# Patient Record
Sex: FEMALE | Race: WHITE | NOT HISPANIC OR LATINO | Employment: FULL TIME | URBAN - METROPOLITAN AREA
[De-identification: names, ages, dates, MRNs, and addresses within clinical notes are randomized per-mention and may not be internally consistent; named-entity substitution may affect disease eponyms.]

---

## 2021-05-30 ENCOUNTER — RECORDS - HEALTHEAST (OUTPATIENT)
Dept: ADMINISTRATIVE | Facility: CLINIC | Age: 56
End: 2021-05-30

## 2021-08-30 ENCOUNTER — OFFICE VISIT (OUTPATIENT)
Dept: ORTHOPEDICS | Facility: CLINIC | Age: 56
End: 2021-08-30
Payer: COMMERCIAL

## 2021-08-30 VITALS
BODY MASS INDEX: 39.27 KG/M2 | WEIGHT: 200 LBS | HEIGHT: 60 IN | DIASTOLIC BLOOD PRESSURE: 84 MMHG | SYSTOLIC BLOOD PRESSURE: 122 MMHG

## 2021-08-30 DIAGNOSIS — S89.91XA INJURY OF RIGHT KNEE, INITIAL ENCOUNTER: Primary | ICD-10-CM

## 2021-08-30 PROCEDURE — 99204 OFFICE O/P NEW MOD 45 MIN: CPT | Performed by: PEDIATRICS

## 2021-08-30 RX ORDER — LEVOTHYROXINE SODIUM 100 UG/1
100 TABLET ORAL
COMMUNITY
Start: 2020-12-14

## 2021-08-30 ASSESSMENT — MIFFLIN-ST. JEOR: SCORE: 1433.69

## 2021-08-30 NOTE — LETTER
8/30/2021         RE: Aurelia Cerna  [address] [facility] [address]        Dear Colleague,    Thank you for referring your patient, Aurelia Cerna, to the [facility]. Please see a copy of my visit note below.    ASSESSMENT & PLAN    Aurelia was seen today for pain.    Diagnoses and all orders for this visit:    Injury of right knee, initial encounter  -     MR Knee Right w/o Contrast; Future      This issue is acute and Unchanged.    We discussed these other possible diagnosis: meniscal injury, patellar subluxation  Given injury, concern for meniscal tear, will obtain MRI. Discussed supportive care in the interim.    Plan:  - Today's Plan of Care:  MRI of the Right Knee - Call [phone] to schedule MRI  Continue with relative rest and activity modification, Ice, Compression, and Elevation.  Can apply ice 10-15 minutes 3-4 times per day as needed. OTC medications as needed.  Crutches as needed  Home Exercise Program - range of motion, quad sets, straight leg raises    -We also discussed other future treatment options:  Referral to Physical Therapy    Follow Up: In clinic with Dr. Soolrio after MRI (wait at least 1-2 days)    Concerning signs and symptoms were reviewed.  The patient expressed understanding of this management plan and all questions were answered at this time.    Anna Solorio MD [facility]  Sports Medicine Physician  [facility]      -----  Chief Complaint   Patient presents with     Right Knee - Pain       SUBJECTIVE  Aurelia Cerna is a/an 55 year old female who is seen as a self referral for evaluation of right knee.  Pain began after walking to her car and had a loud pop in her knee on 8/21/21.  She was seen in the ER and was placed in a knee immobilizer, was using crutches as well, but feels she can walk with immobilizer now.    The patient is seen by themselves.    Onset: 9 day(s) ago. Reports insidious onset without acute  precipitating event.  Location of Pain: right knee  Worsened by: laying on her side, walking   Better with: rest   Treatments tried: ice and ibuprofen, immobilizer   Associated symptoms: feeling of instability    Orthopedic/Surgical history: NO  Social History/Occupation:      No family history pertinent to patient's problem today.    REVIEW OF SYSTEMS:  Review of Systems  Skin: no bruising, yes swelling  Musculoskeletal: as above  Neurologic: no numbness, paresthesias  Remainder of review of systems is negative including constitutional, CV, pulmonary, GI, except as noted in HPI or medical history.    OBJECTIVE:  /84   Ht 1.524 m (5')   Wt 90.7 kg (200 lb)   BMI 39.06 kg/m     General: healthy, alert and in no distress  HEENT: no scleral icterus or conjunctival erythema  Skin: no suspicious lesions or rash. No jaundice.  CV: distal perfusion intact  Resp: normal respiratory effort without conversational dyspnea   Psych: normal mood and affect  Gait: normal steady gait with appropriate coordination and balance  Neuro: Normal light sensory exam of lower extremity    Bilateral Knee exam  Inspection:      moderate effusion right    Patella:      Crepitus noted in the patellofemoral joint bilateral    Tender:      medial patellar border right       medial joint line right    Non Tender:      remainder of knee area bilateral    Knee ROM:      Full active and passive ROM with flexion and extension bilateral    Strength:      5-/5 with knee extension right    Special Tests:     positive (+) Johanna right       neg (-) Lachmans right       neg (-) anterior drawer right       neg (-) posterior drawer right       neg (-) varus at 0 deg and 30 deg right       neg (-) valgus at 0 deg and 30 deg right    Gait:      normal    Neurovascular:      2+ peripheral pulses bilaterally and brisk capillary refill       sensation grossly intact    RADIOLOGY:  I independently ordered, visualized and reviewed these images  with the patient  3 XR views of right knee reviewed 8/21/2021: no acute bony abnormality, no significant degenerative change  - will follow official read    Review of the result(s) of each unique test - XR           Again, thank you for allowing me to participate in the care of your patient.        Sincerely,        Anna Solorio MD

## 2021-08-30 NOTE — PATIENT INSTRUCTIONS
We discussed these other possible diagnosis: meniscal injury, patellar subluxation    Plan:  - Today's Plan of Care:  MRI of the Right Knee - Call [phone] to schedule MRI  Continue with relative rest and activity modification, Ice, Compression, and Elevation.  Can apply ice 10-15 minutes 3-4 times per day as needed. OTC medications as needed.  Crutches as needed  Home Exercise Program - range of motion, quad sets, straight leg raises    -We also discussed other future treatment options:  Referral to Physical Therapy    Follow Up: In clinic with Dr. Solorio after MRI (wait at least 1-2 days)    If you have any further questions for your physician or physician s care team you can call [phone] and use option 3 to leave a voice message. Calls received during business hours will be returned same day.

## 2021-08-30 NOTE — PROGRESS NOTES
ASSESSMENT & PLAN    Aurelia was seen today for pain.    Diagnoses and all orders for this visit:    Injury of right knee, initial encounter  -     MR Knee Right w/o Contrast; Future      This issue is acute and Unchanged.    We discussed these other possible diagnosis: meniscal injury, patellar subluxation  Given injury, concern for meniscal tear, will obtain MRI. Discussed supportive care in the interim.    Plan:  - Today's Plan of Care:  MRI of the Right Knee - Call [phone] to schedule MRI  Continue with relative rest and activity modification, Ice, Compression, and Elevation.  Can apply ice 10-15 minutes 3-4 times per day as needed. OTC medications as needed.  Crutches as needed  Home Exercise Program - range of motion, quad sets, straight leg raises    -We also discussed other future treatment options:  Referral to Physical Therapy    Follow Up: In clinic with Dr. Solorio after MRI (wait at least 1-2 days)    Concerning signs and symptoms were reviewed.  The patient expressed understanding of this management plan and all questions were answered at this time.    Anna Solorio MD [facility]  Sports Medicine Physician  [facility]      -----  Chief Complaint   Patient presents with     Right Knee - Pain       SUBJECTIVE  Aurelia Cerna is a/an 55 year old female who is seen as a self referral for evaluation of right knee.  Pain began after walking to her car and had a loud pop in her knee on 8/21/21.  She was seen in the ER and was placed in a knee immobilizer, was using crutches as well, but feels she can walk with immobilizer now.    The patient is seen by themselves.    Onset: 9 day(s) ago. Reports insidious onset without acute precipitating event.  Location of Pain: right knee  Worsened by: laying on her side, walking   Better with: rest   Treatments tried: ice and ibuprofen, immobilizer   Associated symptoms: feeling of instability    Orthopedic/Surgical history: NO  Social  History/Occupation:      No family history pertinent to patient's problem today.    REVIEW OF SYSTEMS:  Review of Systems  Skin: no bruising, yes swelling  Musculoskeletal: as above  Neurologic: no numbness, paresthesias  Remainder of review of systems is negative including constitutional, CV, pulmonary, GI, except as noted in HPI or medical history.    OBJECTIVE:  /84   Ht 1.524 m (5')   Wt 90.7 kg (200 lb)   BMI 39.06 kg/m     General: healthy, alert and in no distress  HEENT: no scleral icterus or conjunctival erythema  Skin: no suspicious lesions or rash. No jaundice.  CV: distal perfusion intact  Resp: normal respiratory effort without conversational dyspnea   Psych: normal mood and affect  Gait: normal steady gait with appropriate coordination and balance  Neuro: Normal light sensory exam of lower extremity    Bilateral Knee exam  Inspection:      moderate effusion right    Patella:      Crepitus noted in the patellofemoral joint bilateral    Tender:      medial patellar border right       medial joint line right    Non Tender:      remainder of knee area bilateral    Knee ROM:      Full active and passive ROM with flexion and extension bilateral    Strength:      5-/5 with knee extension right    Special Tests:     positive (+) Johanna right       neg (-) Lachmans right       neg (-) anterior drawer right       neg (-) posterior drawer right       neg (-) varus at 0 deg and 30 deg right       neg (-) valgus at 0 deg and 30 deg right    Gait:      normal    Neurovascular:      2+ peripheral pulses bilaterally and brisk capillary refill       sensation grossly intact    RADIOLOGY:  I independently ordered, visualized and reviewed these images with the patient  3 XR views of right knee reviewed 8/21/2021: no acute bony abnormality, no significant degenerative change  - will follow official read    Review of the result(s) of each unique test - XR

## 2022-01-05 ENCOUNTER — HOSPITAL ENCOUNTER (OUTPATIENT)
Dept: MRI IMAGING | Facility: CLINIC | Age: 57
Discharge: HOME | End: 2022-01-05
Attending: PEDIATRICS | Admitting: PEDIATRICS
Payer: COMMERCIAL

## 2022-01-05 DIAGNOSIS — S89.91XA INJURY OF RIGHT KNEE, INITIAL ENCOUNTER: ICD-10-CM

## 2022-01-05 PROCEDURE — 73721 MRI JNT OF LWR EXTRE W/O DYE: CPT | Mod: RT

## 2022-01-10 ENCOUNTER — TELEPHONE (OUTPATIENT)
Dept: ORTHOPEDICS | Facility: CLINIC | Age: 57
End: 2022-01-10
Payer: COMMERCIAL

## 2022-01-10 DIAGNOSIS — S89.91XA INJURY OF RIGHT KNEE, INITIAL ENCOUNTER: Primary | ICD-10-CM

## 2022-01-10 DIAGNOSIS — S89.91XA RIGHT KNEE INJURY: ICD-10-CM

## 2022-01-10 NOTE — TELEPHONE ENCOUNTER
Please call patient with MRI results:    MR Knee Right w/o Contrast  Result Date: 1/5/2022  EXAM: MR KNEE RIGHT W/O CONTRAST LOCATION: [facility] DATE/TIME: 1/5/2022 6:27 PM INDICATION: Knee pain following injury. Evaluate meniscus. COMPARISON: 8/21/2021 radiographs. TECHNIQUE: Unenhanced. FINDINGS: MEDIAL COMPARTMENT: -Meniscus: High-grade tear of the posterior horn medial meniscus root attachment. The body of the medial meniscus is peripherally extruded. -Cartilage: Moderate to high-grade chondromalacia in the central aspect of the medial femoral condyle. Mild chondromalacia in the central aspect of the medial tibia. LATERAL COMPARTMENT: -Meniscus: Normal. -Cartilage: Normal. PATELLOFEMORAL COMPARTMENT: -Alignment: Patella midline. No subluxation or tilting. -Cartilage: Mild chondromalacia in the medial patellar facet. CRUCIATE LIGAMENTS: -ACL: Normal. -PCL: Normal. COLLATERAL LIGAMENTS: -Medial collateral ligament: Superficial and deep fibers are normal. -Lateral collateral ligament: Normal. POSTEROMEDIAL CORNER: -Distal semimembranosus tendon is normal. -Pes anserine tendons are normal. Posteromedial corner complex ligaments are intact. POSTEROLATERAL CORNER: -Popliteal tendon is intact. No tendinopathy. -Biceps femoris tendon and posterolateral corner complex ligaments are intact. EXTENSOR MECHANISM: -Quadriceps tendon: Normal. -Patellar tendon: Normal. -Patellofemoral ligaments and retinacula: Intact. JOINT: -Small knee joint effusion and popliteal cyst. No loose body or synovitis. BONES: -No fracture or concerning marrow replacing lesion. SOFT TISSUES: -Unremarkable.   IMPRESSION: 1.  High-grade tear of the posterior horn medial meniscus root attachment. The body medial meniscus is peripherally extruded. 2.  Medial compartment chondromalacia centrally. 3.  Mild chondromalacia in the medial patellar facet. 4.  Small knee joint effusion and popliteal cyst.    In Summary:  - medial  meniscal root tear  - some early arthritis  - joint effusion and popliteal cyst    I Recommend:  - Given medial meniscal root tear I would likely recommend orthopedic surgery referral, however, I also have not seen the patient in clinic in ~ 5 months  - Can place surgical referral if desired or schedule appointment with me to review images, repeat exam and discuss my recommendations    Anna Solorio MD

## 2022-01-10 NOTE — TELEPHONE ENCOUNTER
Called LVM to return phone call to discuss MRI results and next steps.    Cecelia Vanegas ATC, CSCS

## 2022-01-10 NOTE — TELEPHONE ENCOUNTER
Spoke with Aurelia. Relayed the following information:     In Summary:  - medial meniscal root tear  - some early arthritis  - joint effusion and popliteal cyst     I Recommend:  - Given medial meniscal root tear I would likely recommend orthopedic surgery referral, however, I also have not seen the patient in clinic in ~ 5 months  - Can place surgical referral if desired or schedule appointment with me to review images, repeat exam and discuss my recommendations    Patient was understanding and had no further questions. Requested orthopedic surgery referral. Referral is placed for the patient.      Cecelia Vanegas ATC, CSCS  Dr. Solorio's Extender

## 2022-01-13 NOTE — RESULT ENCOUNTER NOTE
Patient was called and results were reviewed over the phone.    Anna Solorio MD CAQ  Primary Care Sports Medicine  [facility]

## 2022-01-19 ENCOUNTER — ANCILLARY PROCEDURE (OUTPATIENT)
Dept: GENERAL RADIOLOGY | Facility: CLINIC | Age: 57
End: 2022-01-19
Attending: ORTHOPAEDIC SURGERY
Payer: COMMERCIAL

## 2022-01-19 ENCOUNTER — OFFICE VISIT (OUTPATIENT)
Dept: ORTHOPEDICS | Facility: CLINIC | Age: 57
End: 2022-01-19
Payer: COMMERCIAL

## 2022-01-19 VITALS
HEART RATE: 68 BPM | RESPIRATION RATE: 16 BRPM | DIASTOLIC BLOOD PRESSURE: 70 MMHG | WEIGHT: 190 LBS | SYSTOLIC BLOOD PRESSURE: 104 MMHG | HEIGHT: 60 IN | BODY MASS INDEX: 37.3 KG/M2

## 2022-01-19 DIAGNOSIS — M25.561 CHRONIC PAIN OF RIGHT KNEE: ICD-10-CM

## 2022-01-19 DIAGNOSIS — G89.29 CHRONIC PAIN OF RIGHT KNEE: ICD-10-CM

## 2022-01-19 DIAGNOSIS — S83.231A COMPLEX TEAR OF MEDIAL MENISCUS OF RIGHT KNEE AS CURRENT INJURY, INITIAL ENCOUNTER: ICD-10-CM

## 2022-01-19 DIAGNOSIS — M17.11 PRIMARY OSTEOARTHRITIS OF RIGHT KNEE: Primary | ICD-10-CM

## 2022-01-19 DIAGNOSIS — S89.91XA RIGHT KNEE INJURY: ICD-10-CM

## 2022-01-19 PROCEDURE — 73562 X-RAY EXAM OF KNEE 3: CPT | Mod: RT | Performed by: RADIOLOGY

## 2022-01-19 PROCEDURE — 20610 DRAIN/INJ JOINT/BURSA W/O US: CPT | Mod: RT | Performed by: ORTHOPAEDIC SURGERY

## 2022-01-19 PROCEDURE — 99203 OFFICE O/P NEW LOW 30 MIN: CPT | Mod: 25 | Performed by: ORTHOPAEDIC SURGERY

## 2022-01-19 RX ORDER — BUPIVACAINE HYDROCHLORIDE 2.5 MG/ML
4 INJECTION, SOLUTION INFILTRATION; PERINEURAL
Status: SHIPPED | OUTPATIENT
Start: 2022-01-19

## 2022-01-19 RX ORDER — METHYLPREDNISOLONE ACETATE 80 MG/ML
80 INJECTION, SUSPENSION INTRA-ARTICULAR; INTRALESIONAL; INTRAMUSCULAR; SOFT TISSUE
Status: SHIPPED | OUTPATIENT
Start: 2022-01-19

## 2022-01-19 RX ADMIN — METHYLPREDNISOLONE ACETATE 80 MG: 80 INJECTION, SUSPENSION INTRA-ARTICULAR; INTRALESIONAL; INTRAMUSCULAR; SOFT TISSUE at 16:40

## 2022-01-19 RX ADMIN — BUPIVACAINE HYDROCHLORIDE 4 ML: 2.5 INJECTION, SOLUTION INFILTRATION; PERINEURAL at 16:40

## 2022-01-19 ASSESSMENT — PAIN SCALES - GENERAL: PAINLEVEL: SEVERE PAIN (6)

## 2022-01-19 ASSESSMENT — MIFFLIN-ST. JEOR: SCORE: 1383.33

## 2022-01-19 NOTE — LETTER
"    1/19/2022         RE: Aurelia Cerna  [address] [facility] [address]        Dear Colleague,    Thank you for referring your patient, Aurelia Cerna, to the [facility]. Please see a copy of my visit note below.    CHIEF COMPLAINT:   Chief Complaint   Patient presents with     Consult For     Right knee injury on 8/21/21 heard the knee pop and could not bear weight. Patient stated that she did fall down 12 steps in July of 2021 but landed on the tailbone and left side body, did not have any knee pain at that time. Pain is sharp and constant. Rest, ice, heat and elevation helps and having the knee completely straight, bending or pivoting makes the pain worse. The pain is located on the lateral side of knee. Patient has tried PT, brace and crutches.    .        HISTORY OF PRESENT ILLNESS    Aurelia Cerna is a 56 year old female seen for evaluation of ongoing right knee pain with no known injury.   Pain has been present for 5 months. Locates pain along the top and outer aspect of the knee, constant dull pain, aggravated with movements, twisting, kneeling.  Cant knee, sit cross legged. Taking Aleve for pain control. Has done some Physical Therapy, used ice and heat. Used crutches early on for a couple of weeks. Started to have a feeling that it would \"catch\" or \"give out\" so decided to get the MRI last month.    Mom, sister and brother with knee problems.        Present symptoms: pain laterally and anteriorly, pain sharp , moderate pain, mild swelling.    Pain severity: 6/10  Frequency of symptoms: are constant  Exacerbating Factors: weight bearing, stairs, [id number]  Relieving Factors: rest, sitting  Night Pain: Yes  Pain while at rest: Yes   Numbness or tingling: No   Patient has tried:     NSAIDS: Yes      Physical Therapy: Yes      Activity modification: Yes      Bracing: No      Injections: No      Ice: Yes      Assistive device:  Yes, crutches for a couple of weeks     " Other: heat    Other PMH:  has a past medical history of Thyroid disease.  There is no problem list on file for this patient.      Surgical Hx:  has a past surgical history that includes  section ().    Medications:   Current Outpatient Medications:      levothyroxine (SYNTHROID/LEVOTHROID) 100 MCG tablet, Take 100 mcg by mouth, Disp: , Rfl:     Allergies: No Known Allergies    Social Hx: accounting.   reports that she has never smoked. She has never used smokeless tobacco. She reports current alcohol use. She reports that she does not use drugs.    Family Hx: family history includes Cerebrovascular Disease in her father; Hypertension in her mother.    REVIEW OF SYSTEMS: 10 point ROS neg other than the symptoms noted above in the HPI and PMH. Notables include  CONSTITUTIONAL:NEGATIVE for fever, chills, change in weight  INTEGUMENTARY/SKIN: NEGATIVE for worrisome rashes, moles or lesions  MUSCULOSKELETAL:See HPI above  NEURO: NEGATIVE for weakness, dizziness or paresthesias    PHYSICAL EXAM:  /70   Pulse 68   Resp 16   Ht 1.524 m (5')   Wt 86.2 kg (190 lb)   BMI 37.11 kg/m     GENERAL APPEARANCE: healthy, alert, no distress  SKIN: no suspicious lesions or rashes  NEURO: Normal strength and tone, mentation intact and speech normal  PSYCH:  mentation appears normal and affect normal, not anxious  RESPIRATORY: No increased work of breathing.  HANDS: no clubbing, nail pitting  LYMPH: no palpable popliteal lymphadenopathy.    BILATERAL LOWER EXTREMITIES:  Gait: slight favors the right.  No gross deformities or masses.  No Quad atrophy, strength normal.  Intact sensation deep peroneal nerve, superficial peroneal nerve, med/lat tibial nerve, sural nerve, saphenous nerve  Intact EHL, EDL, TA, FHL, GS, quadriceps hamstrings and hip flexors  Toes warm and well perfused, brisk capillary refill. Palpable 2+ dp pulses.  Bilateral calf soft and nttp or squeeze.  DTRs: achilles 2+, patella 2+.    RIGHT KNEE  EXAM:    Skin: intact, no ecchymosis or erythema  Squat 50% limited by discomfort.  ROM: full extension to 125 flexion  Tight hamstrings on straight leg raise.  Effusion: small  Tender: medial joint line, anterior knee medial and lateral patello-femoral ; less so lateral knee and joint line  nontender to palpation posterior knee  McMurrays: negative    MCL: stable, and non-painful at both 0 and 30 degrees knee flexion  Varus stress: stable, and non-painful at both 0 and 30 degrees knee flexion  Lachmans: neg, firm endpoint  Posterior Drawer stable  Patellofemoral joint:                Apprehension: negative              Crepitations: mild   Grind: positive.    LEFT KNEE EXAM:    Skin: intact, no ecchymosis or erythema  ROM: full extension to 125+ flexion  Tight hamstrings on straight leg raise.  Effusion: none  Tender: NTTP med/lat joint line, anterior or posterior knee  McMurrays: negative    MCL: stable, and non-painful at both 0 and 30 degrees knee flexion  Varus stress: stable, and non-painful at both 0 and 30 degrees knee flexion  Lachmans: neg, firm endpoint  Posterior Drawer stable  Patellofemoral joint:                Apprehension: negative              Crepitations: mild    X-RAY:  3 views right knee from 1/19/2022 were reviewed in clinic today. On my review, no obvious fractures or dislocations. Mild-moderate medial compartment narrowing. Patello-femoral osteophytes.    MRI:  MRI right knee from 1/5/2022 was reviewed in clinic today.     1.  High-grade tear of the posterior horn medial meniscus root attachment. The body medial meniscus is peripherally extruded.  2.  Medial compartment chondromalacia centrally. Moderate to high-grade chondromalacia in the central aspect of the medial femoral condyle. Mild chondromalacia in the central aspect of the medial tibia.  3.  Mild chondromalacia in the medial patellar facet.  4.  Small knee joint effusion and popliteal cyst.        ASSESSMENT/PLAN: Aurelia Cerna  "is a 56 year old female with chronic right knee pain, primary osteoarthritis, complex medial meniscus root tear, effusion.     * reviewed imaging studies with patient, showing arthritic changes, or wearing of the cartilage in the knee. This can be caused by normal \"wear and tear\" over the years or following prior injury to the knee.    Treatment typically starts nonsurgically. Surgical indication for total knee arthroplasty  when nonsurgical management is no longer effective.    I'm not certain that arthroscopy surgery would provide the pain relief she seeks given the amount of underlying osteoarthritis, but could potentially be an option in the future as needed.    Non-surgical treatment for knee arthritis includes:    * rest, sitting  * Activity modification - avoid impact activities or activities that aggravate symptoms.  * NSAIDS (non-steroidal anti-inflammatory medications; e.g. Aleve, advil, motrin, ibuprofen) - regular use for inflammation ( twice daily or three times daily), with food, as long as no contra-indications Please discuss with primary care doctor if needed  * ice, 15-20 minutes at a time several times a day or as needed.  * Strengthening of quadriceps muscles  * Physical Therapy for strengthening, stretching and range of motion exercises of legs  * Tylenol as needed for pain, consider Tylenol arthritis or similar  * Weight loss: Weight loss:  Body mass index is 37.11 kg/m .. weight loss benefits, not only for the current pain symptoms, but also overall health. Recommend a good diet plan that works for the patient, with the assistance of a dietician or primary care doctor as needed. Also, a good, low-impact exercise program for at least 20 minutes per day, 3 times per week, such as exercise bike, elliptical , or pool.  * Exercise: low impact such as stationary bike, elliptical, pool.  * Injections: cortisone versus viscosupplementation (hyaluronic acid, \"rooster comb\", \"gel shots\"); risks " and perceived benefits discussed today. Patient elects to proceed.  * Bracing: bracing the knee may offer some relief of symptoms when worn and provide some stability.  * over the counter supplements such as glucosamine and chondroitin sulfate may help with joint pain.  * topical ointments may help as well    * return to clinic as needed.          Edgar Tirado M.D., M.S.  Dept. of Orthopaedic Surgery  [facility]    Large Joint Injection/Arthocentesis: R knee joint    Date/Time: 1/19/2022 4:40 PM  Performed by: Edgar Tirado MD  Authorized by: Edgar Tirado MD     Indications:  Pain  Needle Size:  22 G  Guidance: landmark guided    Approach:  Superolateral  Location:  Knee      Medications:  80 mg methylPREDNISolone 80 MG/ML; 4 mL bupivacaine 0.25 %  Aspirate amount (mL):  15  Aspirate:  Clear  Outcome:  Tolerated well, no immediate complications  Procedure discussed: discussed risks, benefits, and alternatives    Consent Given by:  Patient  Timeout: timeout called immediately prior to procedure    Prep: patient was prepped and draped in usual sterile fashion                Again, thank you for allowing me to participate in the care of your patient.        Sincerely,        Edgar Tirado MD

## 2022-05-02 ENCOUNTER — THERAPY VISIT (OUTPATIENT)
Dept: PHYSICAL THERAPY | Facility: CLINIC | Age: 57
End: 2022-05-02
Payer: COMMERCIAL

## 2022-05-02 DIAGNOSIS — Z47.89 AFTERCARE FOLLOWING SURGERY OF THE MUSCULOSKELETAL SYSTEM: ICD-10-CM

## 2022-05-02 PROCEDURE — 97110 THERAPEUTIC EXERCISES: CPT | Mod: GP | Performed by: PHYSICAL THERAPIST

## 2022-05-02 PROCEDURE — 97161 PT EVAL LOW COMPLEX 20 MIN: CPT | Mod: GP | Performed by: PHYSICAL THERAPIST

## 2022-05-02 PROCEDURE — 97116 GAIT TRAINING THERAPY: CPT | Mod: GP | Performed by: PHYSICAL THERAPIST

## 2022-05-02 ASSESSMENT — ACTIVITIES OF DAILY LIVING (ADL)
KNEE_ACTIVITY_OF_DAILY_LIVING_SCORE: 28.57
GO DOWN STAIRS: ACTIVITY IS VERY DIFFICULT
SIT WITH YOUR KNEE BENT: ACTIVITY IS SOMEWHAT DIFFICULT
WALK: ACTIVITY IS VERY DIFFICULT
HOW_WOULD_YOU_RATE_THE_OVERALL_FUNCTION_OF_YOUR_KNEE_DURING_YOUR_USUAL_DAILY_ACTIVITIES?: SEVERELY ABNORMAL
PAIN: THE SYMPTOM AFFECTS MY ACTIVITY MODERATELY
GIVING WAY, BUCKLING OR SHIFTING OF KNEE: THE SYMPTOM AFFECTS MY ACTIVITY SEVERELY
AS_A_RESULT_OF_YOUR_KNEE_INJURY,_HOW_WOULD_YOU_RATE_YOUR_CURRENT_LEVEL_OF_DAILY_ACTIVITY?: SEVERELY ABNORMAL
GO UP STAIRS: ACTIVITY IS VERY DIFFICULT
RAW_SCORE: 20
WEAKNESS: THE SYMPTOM AFFECTS MY ACTIVITY SEVERELY
KNEE_ACTIVITY_OF_DAILY_LIVING_SUM: 20
STIFFNESS: THE SYMPTOM AFFECTS MY ACTIVITY MODERATELY
SWELLING: THE SYMPTOM AFFECTS MY ACTIVITY MODERATELY
STAND: ACTIVITY IS SOMEWHAT DIFFICULT
RISE FROM A CHAIR: ACTIVITY IS VERY DIFFICULT
SQUAT: I AM UNABLE TO DO THE ACTIVITY
LIMPING: THE SYMPTOM AFFECTS MY ACTIVITY MODERATELY
KNEEL ON THE FRONT OF YOUR KNEE: I AM UNABLE TO DO THE ACTIVITY
HOW_WOULD_YOU_RATE_THE_CURRENT_FUNCTION_OF_YOUR_KNEE_DURING_YOUR_USUAL_DAILY_ACTIVITIES_ON_A_SCALE_FROM_0_TO_100_WITH_100_BEING_YOUR_LEVEL_OF_KNEE_FUNCTION_PRIOR_TO_YOUR_INJURY_AND_0_BEING_THE_INABILITY_TO_PERFORM_ANY_OF_YOUR_USUAL_DAILY_ACTIVITIES?: 50

## 2022-05-02 NOTE — PROGRESS NOTES
Physical Therapy Initial Evaluation  Subjective:  The history is provided by the patient. No  was used.   Patient Health History  Aurelia Cerna being seen for Post op R knee Medial Hemiarthroplasty.     Problem began: 4/28/2022.   Problem occurred: fell Last June, had a large meniscus tear that continued to flap up, also a Baker's cyst per patient. pt reports partial knee replacement already feels better than her knee before.    Pain is reported as 5/10 on pain scale.  General health as reported by patient is good.  Pertinent medical history includes: none.   Red flags:  None as reported by patient.     Surgeries include:  Orthopedic surgery.    Current medications:  Pain medication.    Current occupation is [employer].   Primary job tasks include:  Computer work.   Other job/home tasks details: returning to work tomorrow.                Therapist Generated HPI Evaluation         Type of problem:  Right knee.    This is a new condition.  Condition occurred with:  A fall/slip and other reason.  Where condition occurred: other.  Patient reports pain:  Anterior.  Pain is described as aching   Radiates to: none.   Since onset symptoms are gradually improving.  Associated symptoms:  Edema, loss of motion/stiffness and loss of strength. Symptoms are exacerbated by certain positions  and relieved by ice.      Restrictions due to condition include:  Working in normal job without restrictions.  Home/work barriers: bedroom and bathroom on same level, 5 steps to enter home however pt has a railing, able to perform with up with good, down with bad.                        Objective:    Gait:  Pt observed with locked elbows with B crutches, putting weight through B axilla  Gait Type:  Antalgic   Weight Bearing Status:  WBAT   Assistive Devices:  Crutches  Deviations:  Knee:  Knee extension decr R and knee flexion decr RGeneral Deviations:  Mylene decr and stance time decr                                                       Knee Evaluation:  ROM:    AROM      Extension: Left:    Right:  Lacking 15*  Flexion: Left:   Right: 85    Pain: pain with knee flexion and extension    Strength:     Extension:  Right: 2+/5   Weak/painful  Pain:  Flexion:  Right: 3+/5   Strong/painful  Pain:      Quad Set Right:  Poor and delayed    Pain: +/-                  General     ROS    Assessment/Plan:    Patient is a 56 year old female with right side knee complaints.    Patient has the following significant findings with corresponding treatment plan.                Diagnosis 1:  Post op Partial knee replacement R knee  Pain -  manual therapy, self management, education and home program  Decreased ROM/flexibility - manual therapy and therapeutic exercise  Decreased strength - therapeutic exercise and therapeutic activities  Impaired gait - gait training  Decreased function - therapeutic activities    Therapy Evaluation Codes:   1) History comprised of:   Personal factors that impact the plan of care:      None.    Comorbidity factors that impact the plan of care are:      None.     Medications impacting care: None.  2) Examination of Body Systems comprised of:   Body structures and functions that impact the plan of care:      right knee.   Activity limitations that impact the plan of care are:      stairs, walking, standing, transfers.  3) Clinical presentation characteristics are:   Stable/Uncomplicated.  4) Decision-Making    Low complexity using standardized patient assessment instrument and/or measureable assessment of functional outcome.  Cumulative Therapy Evaluation is: Low complexity.    Previous and current functional limitations:  (See Goal Flow Sheet for this information)    Short term and Long term goals: (See Goal Flow Sheet for this information)     Communication ability:  Patient appears to be able to clearly communicate and understand verbal and written communication and follow directions correctly.  Treatment Explanation - The  following has been discussed with the patient:   RX ordered/plan of care  Anticipated outcomes  Possible risks and side effects  This patient would benefit from PT intervention to resume normal activities.   Rehab potential is excellent.    Frequency: 2 X week, once daily  Duration:  for 6 weeks  Discharge Plan:  Achieve all LTG.  Independent in home treatment program.  Reach maximal therapeutic benefit.    Please refer to the daily flowsheet for treatment today, total treatment time and time spent performing 1:1 timed codes.

## 2022-05-02 NOTE — LETTER
[facility]  [address]  [address]  [place] [address]  [phone]    May 3, 2022    Re: Aurelia Cerna   :   1965  MRN:  [id number]   REFERRING PHYSICIAN:   Jakob MCCULLOUGH [facility]    Date of Initial Evaluation:  2022  Visits:  Rxs Used: 1  Reason for Referral:  Aftercare following surgery of the musculoskeletal system    EVALUATION SUMMARY    Physical Therapy Initial Evaluation  Subjective:  The history is provided by the patient. No  was used.   Patient Health History  Aurelia Cerna being seen for Post op R knee Medial Hemiarthroplasty.   Problem began: 2022.   Problem occurred: fell Last , had a large meniscus tear that continued to flap up, also a Baker's cyst per patient. pt reports partial knee replacement already feels better than her knee before.    Pain is reported as 5/10 on pain scale.  General health as reported by patient is good.  Pertinent medical history includes: none.   Red flags:  None as reported by patient.   Surgeries include:  Orthopedic surgery.    Current medications:  Pain medication.    Current occupation is [employer].   Primary job tasks include:  Computer work.   Other job/home tasks details: returning to work tomorrow.                Therapist Generated HPI Evaluation  Type of problem:  Right knee.  This is a new condition.  Condition occurred with:  A fall/slip and other reason.  Where condition occurred: other.  Patient reports pain:  Anterior.  Pain is described as aching   Radiates to: none.   Since onset symptoms are gradually improving.  Associated symptoms:  Edema, loss of motion/stiffness and loss of strength. Symptoms are exacerbated by certain positions  and relieved by ice.  Restrictions due to condition include:  Working in normal job without restrictions.  Home/work barriers: bedroom and bathroom on same  level, 5 steps to enter home however pt has a railing, able to perform with up with good, down with bad.  Re: Aurelia Cerna   :   1965    Objective:  Gait:  Pt observed with locked elbows with B crutches, putting weight through B axilla  Gait Type:  Antalgic   Weight Bearing Status:  WBAT   Assistive Devices:  Crutches  Deviations:  Knee:  Knee extension decr R and knee flexion decr RGeneral Deviations:  Mylene decr and stance time decr       Knee Evaluation:  ROM:    AROM  Extension: Left:    Right:  Lacking 15*  Flexion: Left:   Right: 85  Pain: pain with knee flexion and extension  Strength:   Extension:  Right: 2+/5   Weak/painful  Pain:  Flexion:  Right: 3+/5   Strong/painful  Pain:    Quad Set Right:  Poor and delayed    Pain: +/-    Assessment/Plan:    Patient is a 56 year old female with right side knee complaints.    Patient has the following significant findings with corresponding treatment plan.                Diagnosis 1:  Post op Partial knee replacement R knee  Pain -  manual therapy, self management, education and home program  Decreased ROM/flexibility - manual therapy and therapeutic exercise  Decreased strength - therapeutic exercise and therapeutic activities  Impaired gait - gait training  Decreased function - therapeutic activities    Therapy Evaluation Codes:   1) History comprised of:   Personal factors that impact the plan of care:     None.    Comorbidity factors that impact the plan of care are:     None.     Medications impacting care: None.  2) Examination of Body Systems comprised of:   Body structures and functions that impact the plan of care:     right knee.   Activity limitations that impact the plan of care are:     stairs, walking, standing, transfers.  3) Clinical presentation characteristics are:  Stable/Uncomplicated.  4) Decision-Making   Low complexity using standardized patient assessment instrument and/or measureable assessment of functional outcome.  Cumulative  Therapy Evaluation is: Low complexity.  Previous and current functional limitations:  (See Goal Flow Sheet for this information)    Short term and Long term goals: (See Goal Flow Sheet for this information)   Communication ability:  Patient appears to be able to clearly communicate and understand verbal and written communication and follow directions correctly.  Treatment Explanation - The following has been discussed with the patient:   RX ordered/plan of care  Re: Aurelia Cerna   :   1965    Anticipated outcomes  Possible risks and side effects  This patient would benefit from PT intervention to resume normal activities.   Rehab potential is excellent.  Frequency: 2 X week, once daily  Duration:  for 6 weeks  Discharge Plan:  Achieve all LTG.  Independent in home treatment program.  Reach maximal therapeutic benefit.    Thank you for your referral.    INQUIRIES  Therapist: Mary Culver. PT, DPT  [facility] [address] [address]  Phone: [phone]  Fax: [phone]

## 2022-05-12 ENCOUNTER — THERAPY VISIT (OUTPATIENT)
Dept: PHYSICAL THERAPY | Facility: CLINIC | Age: 57
End: 2022-05-12
Payer: COMMERCIAL

## 2022-05-12 DIAGNOSIS — Z47.89 AFTERCARE FOLLOWING SURGERY OF THE MUSCULOSKELETAL SYSTEM: Primary | ICD-10-CM

## 2022-05-12 PROCEDURE — 97110 THERAPEUTIC EXERCISES: CPT | Mod: GP | Performed by: PHYSICAL THERAPIST

## 2022-05-17 ENCOUNTER — THERAPY VISIT (OUTPATIENT)
Dept: PHYSICAL THERAPY | Facility: CLINIC | Age: 57
End: 2022-05-17
Payer: COMMERCIAL

## 2022-05-17 DIAGNOSIS — Z47.89 AFTERCARE FOLLOWING SURGERY OF THE MUSCULOSKELETAL SYSTEM: Primary | ICD-10-CM

## 2022-05-17 PROCEDURE — 97110 THERAPEUTIC EXERCISES: CPT | Mod: GP | Performed by: PHYSICAL THERAPIST

## 2022-05-19 ENCOUNTER — THERAPY VISIT (OUTPATIENT)
Dept: PHYSICAL THERAPY | Facility: CLINIC | Age: 57
End: 2022-05-19
Payer: COMMERCIAL

## 2022-05-19 DIAGNOSIS — Z47.89 AFTERCARE FOLLOWING SURGERY OF THE MUSCULOSKELETAL SYSTEM: Primary | ICD-10-CM

## 2022-05-19 PROCEDURE — 97116 GAIT TRAINING THERAPY: CPT | Mod: GP

## 2022-05-19 PROCEDURE — 97110 THERAPEUTIC EXERCISES: CPT | Mod: GP

## 2022-05-23 ENCOUNTER — THERAPY VISIT (OUTPATIENT)
Dept: PHYSICAL THERAPY | Facility: CLINIC | Age: 57
End: 2022-05-23
Payer: COMMERCIAL

## 2022-05-23 DIAGNOSIS — Z47.89 AFTERCARE FOLLOWING SURGERY OF THE MUSCULOSKELETAL SYSTEM: Primary | ICD-10-CM

## 2022-05-23 PROCEDURE — 97110 THERAPEUTIC EXERCISES: CPT | Mod: GP

## 2022-05-26 ENCOUNTER — THERAPY VISIT (OUTPATIENT)
Dept: PHYSICAL THERAPY | Facility: CLINIC | Age: 57
End: 2022-05-26
Payer: COMMERCIAL

## 2022-05-26 DIAGNOSIS — Z47.89 AFTERCARE FOLLOWING SURGERY OF THE MUSCULOSKELETAL SYSTEM: Primary | ICD-10-CM

## 2022-05-26 PROCEDURE — 97110 THERAPEUTIC EXERCISES: CPT | Mod: GP

## 2022-06-02 ENCOUNTER — THERAPY VISIT (OUTPATIENT)
Dept: PHYSICAL THERAPY | Facility: CLINIC | Age: 57
End: 2022-06-02
Payer: COMMERCIAL

## 2022-06-02 DIAGNOSIS — Z47.89 AFTERCARE FOLLOWING SURGERY OF THE MUSCULOSKELETAL SYSTEM: Primary | ICD-10-CM

## 2022-06-02 PROCEDURE — 97530 THERAPEUTIC ACTIVITIES: CPT | Mod: GP

## 2022-06-02 PROCEDURE — 97110 THERAPEUTIC EXERCISES: CPT | Mod: GP

## 2022-06-02 PROCEDURE — 97140 MANUAL THERAPY 1/> REGIONS: CPT | Mod: GP

## 2022-06-09 ENCOUNTER — THERAPY VISIT (OUTPATIENT)
Dept: PHYSICAL THERAPY | Facility: CLINIC | Age: 57
End: 2022-06-09
Payer: COMMERCIAL

## 2022-06-09 DIAGNOSIS — Z47.89 AFTERCARE FOLLOWING SURGERY OF THE MUSCULOSKELETAL SYSTEM: Primary | ICD-10-CM

## 2022-06-09 PROCEDURE — 97530 THERAPEUTIC ACTIVITIES: CPT | Mod: GP | Performed by: PHYSICAL THERAPIST

## 2022-06-09 PROCEDURE — 97110 THERAPEUTIC EXERCISES: CPT | Mod: GP | Performed by: PHYSICAL THERAPIST

## 2022-06-16 ENCOUNTER — THERAPY VISIT (OUTPATIENT)
Dept: PHYSICAL THERAPY | Facility: CLINIC | Age: 57
End: 2022-06-16
Payer: COMMERCIAL

## 2022-06-16 DIAGNOSIS — Z47.89 AFTERCARE FOLLOWING SURGERY OF THE MUSCULOSKELETAL SYSTEM: Primary | ICD-10-CM

## 2022-06-16 PROCEDURE — 97110 THERAPEUTIC EXERCISES: CPT | Mod: GP | Performed by: PHYSICAL THERAPIST

## 2022-06-16 PROCEDURE — 97530 THERAPEUTIC ACTIVITIES: CPT | Mod: GP | Performed by: PHYSICAL THERAPIST

## 2023-01-26 NOTE — PROGRESS NOTES
"CHIEF COMPLAINT:   Chief Complaint   Patient presents with     Consult For     Right knee injury on 21 heard the knee pop and could not bear weight. Patient stated that she did fall down 12 steps in 2021 but landed on the tailbone and left side body, did not have any knee pain at that time. Pain is sharp and constant. Rest, ice, heat and elevation helps and having the knee completely straight, bending or pivoting makes the pain worse. The pain is located on the lateral side of knee. Patient has tried PT, brace and crutches.    .        HISTORY OF PRESENT ILLNESS    Aurelia Cerna is a 56 year old female seen for evaluation of ongoing right knee pain with no known injury.   Pain has been present for 5 months. Locates pain along the top and outer aspect of the knee, constant dull pain, aggravated with movements, twisting, kneeling.  Cant knee, sit cross legged. Taking Aleve for pain control. Has done some Physical Therapy, used ice and heat. Used crutches early on for a couple of weeks. Started to have a feeling that it would \"catch\" or \"give out\" so decided to get the MRI last month.    Mom, sister and brother with knee problems.        Present symptoms: pain laterally and anteriorly, pain sharp , moderate pain, mild swelling.    Pain severity: 6/10  Frequency of symptoms: are constant  Exacerbating Factors: weight bearing, stairs, [id number]  Relieving Factors: rest, sitting  Night Pain: Yes  Pain while at rest: Yes   Numbness or tingling: No   Patient has tried:     NSAIDS: Yes      Physical Therapy: Yes      Activity modification: Yes      Bracing: No      Injections: No      Ice: Yes      Assistive device:  Yes, crutches for a couple of weeks     Other: heat    Other PMH:  has a past medical history of Thyroid disease.  There is no problem list on file for this patient.      Surgical Hx:  has a past surgical history that includes  section ().    Medications:   Current Outpatient " - Family discussing pursuing home hospice upon discharge  - consulted palliative, appreciate recommendations      Medications:      levothyroxine (SYNTHROID/LEVOTHROID) 100 MCG tablet, Take 100 mcg by mouth, Disp: , Rfl:     Allergies: No Known Allergies    Social Hx: accounting.   reports that she has never smoked. She has never used smokeless tobacco. She reports current alcohol use. She reports that she does not use drugs.    Family Hx: family history includes Cerebrovascular Disease in her father; Hypertension in her mother.    REVIEW OF SYSTEMS: 10 point ROS neg other than the symptoms noted above in the HPI and PMH. Notables include  CONSTITUTIONAL:NEGATIVE for fever, chills, change in weight  INTEGUMENTARY/SKIN: NEGATIVE for worrisome rashes, moles or lesions  MUSCULOSKELETAL:See HPI above  NEURO: NEGATIVE for weakness, dizziness or paresthesias    PHYSICAL EXAM:  /70   Pulse 68   Resp 16   Ht 1.524 m (5')   Wt 86.2 kg (190 lb)   BMI 37.11 kg/m     GENERAL APPEARANCE: healthy, alert, no distress  SKIN: no suspicious lesions or rashes  NEURO: Normal strength and tone, mentation intact and speech normal  PSYCH:  mentation appears normal and affect normal, not anxious  RESPIRATORY: No increased work of breathing.  HANDS: no clubbing, nail pitting  LYMPH: no palpable popliteal lymphadenopathy.    BILATERAL LOWER EXTREMITIES:  Gait: slight favors the right.  No gross deformities or masses.  No Quad atrophy, strength normal.  Intact sensation deep peroneal nerve, superficial peroneal nerve, med/lat tibial nerve, sural nerve, saphenous nerve  Intact EHL, EDL, TA, FHL, GS, quadriceps hamstrings and hip flexors  Toes warm and well perfused, brisk capillary refill. Palpable 2+ dp pulses.  Bilateral calf soft and nttp or squeeze.  DTRs: achilles 2+, patella 2+.    RIGHT KNEE EXAM:    Skin: intact, no ecchymosis or erythema  Squat 50% limited by discomfort.  ROM: full extension to 125 flexion  Tight hamstrings on straight leg raise.  Effusion: small  Tender: medial joint line, anterior knee medial and lateral  patello-femoral ; less so lateral knee and joint line  nontender to palpation posterior knee  McMurrays: negative    MCL: stable, and non-painful at both 0 and 30 degrees knee flexion  Varus stress: stable, and non-painful at both 0 and 30 degrees knee flexion  Lachmans: neg, firm endpoint  Posterior Drawer stable  Patellofemoral joint:                Apprehension: negative              Crepitations: mild   Grind: positive.    LEFT KNEE EXAM:    Skin: intact, no ecchymosis or erythema  ROM: full extension to 125+ flexion  Tight hamstrings on straight leg raise.  Effusion: none  Tender: NTTP med/lat joint line, anterior or posterior knee  McMurrays: negative    MCL: stable, and non-painful at both 0 and 30 degrees knee flexion  Varus stress: stable, and non-painful at both 0 and 30 degrees knee flexion  Lachmans: neg, firm endpoint  Posterior Drawer stable  Patellofemoral joint:                Apprehension: negative              Crepitations: mild    X-RAY:  3 views right knee from 1/19/2022 were reviewed in clinic today. On my review, no obvious fractures or dislocations. Mild-moderate medial compartment narrowing. Patello-femoral osteophytes.    MRI:  MRI right knee from 1/5/2022 was reviewed in clinic today.     1.  High-grade tear of the posterior horn medial meniscus root attachment. The body medial meniscus is peripherally extruded.  2.  Medial compartment chondromalacia centrally. Moderate to high-grade chondromalacia in the central aspect of the medial femoral condyle. Mild chondromalacia in the central aspect of the medial tibia.  3.  Mild chondromalacia in the medial patellar facet.  4.  Small knee joint effusion and popliteal cyst.        ASSESSMENT/PLAN: Aurelia Cerna is a 56 year old female with chronic right knee pain, primary osteoarthritis, complex medial meniscus root tear, effusion.     * reviewed imaging studies with patient, showing arthritic changes, or wearing of the cartilage in the knee.  "This can be caused by normal \"wear and tear\" over the years or following prior injury to the knee.    Treatment typically starts nonsurgically. Surgical indication for total knee arthroplasty  when nonsurgical management is no longer effective.    I'm not certain that arthroscopy surgery would provide the pain relief she seeks given the amount of underlying osteoarthritis, but could potentially be an option in the future as needed.    Non-surgical treatment for knee arthritis includes:    * rest, sitting  * Activity modification - avoid impact activities or activities that aggravate symptoms.  * NSAIDS (non-steroidal anti-inflammatory medications; e.g. Aleve, advil, motrin, ibuprofen) - regular use for inflammation ( twice daily or three times daily), with food, as long as no contra-indications Please discuss with primary care doctor if needed  * ice, 15-20 minutes at a time several times a day or as needed.  * Strengthening of quadriceps muscles  * Physical Therapy for strengthening, stretching and range of motion exercises of legs  * Tylenol as needed for pain, consider Tylenol arthritis or similar  * Weight loss: Weight loss:  Body mass index is 37.11 kg/m .. weight loss benefits, not only for the current pain symptoms, but also overall health. Recommend a good diet plan that works for the patient, with the assistance of a dietician or primary care doctor as needed. Also, a good, low-impact exercise program for at least 20 minutes per day, 3 times per week, such as exercise bike, elliptical , or pool.  * Exercise: low impact such as stationary bike, elliptical, pool.  * Injections: cortisone versus viscosupplementation (hyaluronic acid, \"rooster comb\", \"gel shots\"); risks and perceived benefits discussed today. Patient elects to proceed.  * Bracing: bracing the knee may offer some relief of symptoms when worn and provide some stability.  * over the counter supplements such as glucosamine and chondroitin " sulfate may help with joint pain.  * topical ointments may help as well    * return to clinic as needed.          Edgar Tirado M.D., M.S.  Dept. of Orthopaedic Surgery  [facility]    Large Joint Injection/Arthocentesis: R knee joint    Date/Time: 1/19/2022 4:40 PM  Performed by: Edgar Tirado MD  Authorized by: Edgar Tirado MD     Indications:  Pain  Needle Size:  22 G  Guidance: landmark guided    Approach:  Superolateral  Location:  Knee      Medications:  80 mg methylPREDNISolone 80 MG/ML; 4 mL bupivacaine 0.25 %  Aspirate amount (mL):  15  Aspirate:  Clear  Outcome:  Tolerated well, no immediate complications  Procedure discussed: discussed risks, benefits, and alternatives    Consent Given by:  Patient  Timeout: timeout called immediately prior to procedure    Prep: patient was prepped and draped in usual sterile fashion